# Patient Record
Sex: MALE | Race: WHITE | NOT HISPANIC OR LATINO | Employment: OTHER | URBAN - METROPOLITAN AREA
[De-identification: names, ages, dates, MRNs, and addresses within clinical notes are randomized per-mention and may not be internally consistent; named-entity substitution may affect disease eponyms.]

---

## 2024-03-12 ENCOUNTER — PREP FOR SURGERY (OUTPATIENT)
Dept: OTHER | Facility: HOSPITAL | Age: 44
End: 2024-03-12
Payer: OTHER GOVERNMENT

## 2024-03-12 ENCOUNTER — CLINICAL SUPPORT (OUTPATIENT)
Dept: GASTROENTEROLOGY | Facility: CLINIC | Age: 44
End: 2024-03-12
Payer: OTHER GOVERNMENT

## 2024-03-12 DIAGNOSIS — Z12.11 ENCOUNTER FOR SCREENING FOR MALIGNANT NEOPLASM OF COLON: Primary | ICD-10-CM

## 2024-03-12 RX ORDER — POLYETHYLENE GLYCOL 3350, SODIUM SULFATE ANHYDROUS, SODIUM BICARBONATE, SODIUM CHLORIDE, POTASSIUM CHLORIDE 236; 22.74; 6.74; 5.86; 2.97 G/4L; G/4L; G/4L; G/4L; G/4L
4 POWDER, FOR SOLUTION ORAL ONCE
Qty: 4000 ML | Refills: 0 | Status: SHIPPED | OUTPATIENT
Start: 2024-03-12 | End: 2024-03-12

## 2024-03-12 NOTE — PROGRESS NOTES
Charles Hawkins  1980  43 y.o.    Reason for call: Screening Colonoscopy and Positive Cologuard  Prep prescribed: [place]  Prep instructions reviewed with patient and sent to patient via regular mail to the home address on file  Is the patient currently on any injectable medications for weight loss or diabetes? No  Clearance needed? No  If yes, what clearance is needed? N/A  Clearance has been requested from NA  The patient has been scheduled for: Colonoscopy  After your procedure, you will be contacted with results. Please confirm the best phone # to reach the patient: [phone]  Family history of colon cancer? No  If yes, indicate relative: NA  Family History   Problem Relation Age of Onset    Colon cancer Neg Hx      History reviewed. No pertinent past medical history.  No Known Allergies  Past Surgical History:   Procedure Laterality Date    KNEE FUSION       Social History     Socioeconomic History    Marital status: Unknown   Tobacco Use    Smoking status: Never     Passive exposure: Never    Smokeless tobacco: Never   Substance and Sexual Activity    Alcohol use: Yes    Drug use: Defer    Sexual activity: Defer     No current outpatient medications on file.

## 2024-03-19 NOTE — PRE-PROCEDURE INSTRUCTIONS
"PAT call attempted.  No answer.  Detailed message with date and arrival time of 0600 given.  Come to entrance \"C\"; must have adult  for transportation home; may have two visitors; however, children under 12 must remain in waiting area; instructed on diet/clear liquids/NPO/bowel prep, if needed; may take normal meds two hours prior to arrival time except for blood thinners, antidiabetics, diuretics, and weight loss meds.  Instructed to return call to confirm receipt of instructions and for any questions.  "

## 2024-03-22 ENCOUNTER — ANESTHESIA EVENT (OUTPATIENT)
Dept: GASTROENTEROLOGY | Facility: HOSPITAL | Age: 44
End: 2024-03-22
Payer: OTHER GOVERNMENT

## 2024-03-22 NOTE — ANESTHESIA PREPROCEDURE EVALUATION
Anesthesia Evaluation     Patient summary reviewed and Nursing notes reviewed   no history of anesthetic complications:   NPO Solid Status: > 8 hours  NPO Liquid Status: > 2 hours           Airway   Mallampati: II  TM distance: >3 FB  Neck ROM: full  Dental - normal exam     Pulmonary - negative pulmonary ROS    breath sounds clear to auscultation  Cardiovascular - negative cardio ROS  Exercise tolerance: good (4-7 METS)    Rhythm: regular  Rate: normal        Neuro/Psych    ROS Comment: STATES HAS HAD TWO HERNIATED CERVICAL DISCS IN PAST--DENIES PAIN, NO SURGICAL INTERVENTION  GI/Hepatic/Renal/Endo - negative ROS     Musculoskeletal (-) negative ROS    Abdominal    Substance History - negative use     OB/GYN negative ob/gyn ROS         Other - negative ROS       ROS/Med Hx Other: PAT Nursing Notes unavailable.               Anesthesia Plan    ASA 1     general   total IV anesthesia  (Total IV Anesthesia    Patient understands anesthesia not responsible for dental damage.  )  intravenous induction     Anesthetic plan, risks, benefits, and alternatives have been provided, discussed and informed consent has been obtained with: patient.    Plan discussed with CRNA.    CODE STATUS:

## 2024-03-25 ENCOUNTER — ANESTHESIA (OUTPATIENT)
Dept: GASTROENTEROLOGY | Facility: HOSPITAL | Age: 44
End: 2024-03-25
Payer: OTHER GOVERNMENT

## 2024-03-25 ENCOUNTER — HOSPITAL ENCOUNTER (OUTPATIENT)
Facility: HOSPITAL | Age: 44
Setting detail: HOSPITAL OUTPATIENT SURGERY
Discharge: HOME | End: 2024-03-25
Attending: INTERNAL MEDICINE | Admitting: INTERNAL MEDICINE
Payer: OTHER GOVERNMENT

## 2024-03-25 VITALS
BODY MASS INDEX: 29.57 KG/M2 | RESPIRATION RATE: 18 BRPM | OXYGEN SATURATION: 99 % | HEART RATE: 58 BPM | WEIGHT: 211.2 LBS | TEMPERATURE: 97.2 F | SYSTOLIC BLOOD PRESSURE: 148 MMHG | HEIGHT: 71 IN | DIASTOLIC BLOOD PRESSURE: 93 MMHG

## 2024-03-25 PROCEDURE — 25010000002 PROPOFOL 500 MG/50ML EMULSION

## 2024-03-25 PROCEDURE — 25010000002 PROPOFOL 10 MG/ML EMULSION

## 2024-03-25 PROCEDURE — 25810000003 LACTATED RINGERS PER 1000 ML: Performed by: NURSE ANESTHETIST, CERTIFIED REGISTERED

## 2024-03-25 RX ORDER — PROPOFOL 10 MG/ML
INJECTION, EMULSION INTRAVENOUS CONTINUOUS PRN
Status: DISCONTINUED | OUTPATIENT
Start: 2024-03-25 | End: 2024-03-25 | Stop reason: SURG

## 2024-03-25 RX ORDER — SODIUM CHLORIDE, SODIUM LACTATE, POTASSIUM CHLORIDE, CALCIUM CHLORIDE 600; 310; 30; 20 MG/100ML; MG/100ML; MG/100ML; MG/100ML
30 INJECTION, SOLUTION INTRAVENOUS CONTINUOUS
Status: DISCONTINUED | OUTPATIENT
Start: 2024-03-25 | End: 2024-03-25 | Stop reason: HOSPADM

## 2024-03-25 RX ORDER — PROPOFOL 10 MG/ML
VIAL (ML) INTRAVENOUS AS NEEDED
Status: DISCONTINUED | OUTPATIENT
Start: 2024-03-25 | End: 2024-03-25 | Stop reason: SURG

## 2024-03-25 RX ORDER — LIDOCAINE HYDROCHLORIDE 20 MG/ML
INJECTION, SOLUTION EPIDURAL; INFILTRATION; INTRACAUDAL; PERINEURAL AS NEEDED
Status: DISCONTINUED | OUTPATIENT
Start: 2024-03-25 | End: 2024-03-25 | Stop reason: SURG

## 2024-03-25 RX ADMIN — SODIUM CHLORIDE, POTASSIUM CHLORIDE, SODIUM LACTATE AND CALCIUM CHLORIDE 30 ML/HR: 600; 310; 30; 20 INJECTION, SOLUTION INTRAVENOUS at 06:51

## 2024-03-25 RX ADMIN — PROPOFOL 140 MG: 10 INJECTION, EMULSION INTRAVENOUS at 07:49

## 2024-03-25 RX ADMIN — PROPOFOL 250 MCG/KG/MIN: 10 INJECTION, EMULSION INTRAVENOUS at 07:49

## 2024-03-25 RX ADMIN — LIDOCAINE HYDROCHLORIDE 40 MG: 20 INJECTION, SOLUTION EPIDURAL; INFILTRATION; INTRACAUDAL; PERINEURAL at 07:49

## 2024-03-25 NOTE — H&P
"Pre Procedure History & Physical    Chief Complaint: Colonoscopy    HPI: Patient is 43 years old male with no significant past medical history presented for average risk screening colonoscopy. Patient never had colonoscopy.  He denies family history of colon cancer or colon polyps.  Patient does not take any antiplatelets, anticoagulant or NSAIDs medications    Patient denies obvious GI symptoms including melena, hematochezia or bright red blood per rectum.  No history of hemorrhoids.  No nausea, vomiting, abdominal pain, poor appetite or unintentional significant weight loss      Past Medical History:   History reviewed. No pertinent past medical history.    Past Surgical History:  Past Surgical History:   Procedure Laterality Date    KNEE FUSION         Family History:  Family History   Problem Relation Age of Onset    Colon cancer Neg Hx        Social History:   reports that he has never smoked. He has never been exposed to tobacco smoke. He has never used smokeless tobacco. He reports current alcohol use. Drug use questions deferred to the physician.    Medications:   No medications prior to admission.       Allergies:  Patient has no known allergies.      Objective     Blood pressure 134/94, pulse 50, temperature 98.3 °F (36.8 °C), temperature source Temporal, resp. rate 16, height 180.3 cm (71\"), weight 95.8 kg (211 lb 3.2 oz), SpO2 96%.    Physical Exam   Constitutional: Pt is oriented to person, place, and time and well-developed, well-nourished, and in no distress.   Mouth/Throat: Oropharynx is clear and moist.   Neck: Normal range of motion.   Cardiovascular: Normal rate, regular rhythm and normal heart sounds.    Pulmonary/Chest: Effort normal and breath sounds normal.   Abdominal: Soft. Nontender  Skin: Skin is warm and dry.   Psychiatric: Mood, memory, affect and judgment normal.     Assessment & Plan     Diagnosis:    Average risk screening colonoscopy    Anticipated Surgical " Procedure    Colonoscopy    The risks, benefits, and alternatives of this procedure have been discussed with the patient or the responsible party- the patient understands and agrees to proceed.        Electronically signed by Anuj Saleem MD, 03/25/24, 7:36 AM EDT.

## 2024-03-25 NOTE — ANESTHESIA POSTPROCEDURE EVALUATION
Patient: Charles Hawkins    Procedure Summary       Date: 03/25/24 Room / Location: [facility] ENDOSCOPY 5 / [facility] ENDOSCOPY    Anesthesia Start: 0746 Anesthesia Stop: 0815    Procedure: COLONOSCOPY Diagnosis:       Encounter for screening for malignant neoplasm of colon      (Encounter for screening for malignant neoplasm of colon [Z12.11])    Surgeons: Anuj Saleem MD Provider: Zaki Jorge CRNA    Anesthesia Type: general ASA Status: 1            Anesthesia Type: general    Vitals  Vitals Value Taken Time   /93 03/25/24 0829   Temp 36.2 °C (97.2 °F) 03/25/24 0828   Pulse 51 03/25/24 0830   Resp 18 03/25/24 0828   SpO2 99 % 03/25/24 0830   Vitals shown include unfiled device data.        Post Anesthesia Care and Evaluation    Post-procedure mental status: acceptable.  Pain management: satisfactory to patient    Airway patency: patent  Anesthetic complications: No anesthetic complications    Cardiovascular status: acceptable  Respiratory status: acceptable    Comments: Per chart review

## 2024-03-28 ENCOUNTER — OFFICE VISIT (OUTPATIENT)
Dept: SURGERY | Facility: CLINIC | Age: 44
End: 2024-03-28
Payer: OTHER GOVERNMENT

## 2024-03-28 VITALS — HEIGHT: 71 IN | RESPIRATION RATE: 16 BRPM | BODY MASS INDEX: 29.4 KG/M2 | WEIGHT: 210 LBS

## 2024-03-28 DIAGNOSIS — R22.42 LOCALIZED SWELLING, MASS, OR LUMP OF LEFT LOWER EXTREMITY: Primary | ICD-10-CM

## 2024-03-28 DIAGNOSIS — R22.2 MASS OF CHEST WALL, RIGHT: ICD-10-CM

## 2024-03-28 PROBLEM — R22.40 LOCALIZED SWELLING, MASS AND LUMP, LOWER LIMB: Status: ACTIVE | Noted: 2024-03-28

## 2024-03-28 RX ORDER — POLYETHYLENE GLYCOL-3350 AND ELECTROLYTES 236; 6.74; 5.86; 2.97; 22.74 G/274.31G; G/274.31G; G/274.31G; G/274.31G; G/274.31G
POWDER, FOR SOLUTION ORAL
COMMUNITY
Start: 2024-03-16

## 2024-03-28 NOTE — H&P (VIEW-ONLY)
"Chief Complaint: Follow-up    Subjective         History of Present Illness  Charles Hawkins is a 43 y.o. male presents to [facility] GENERAL SURGERY to be seen for 3 cm right chest wall mass.  He also has a left lower extremity 1 cm lesion that appears to be a cyst.     Objective     Past Medical History:   Diagnosis Date    Diverticulitis of colon 25 MAR 24    Minor - id during colonoscopy       Past Surgical History:   Procedure Laterality Date    COLONOSCOPY N/A 03/25/2024    Procedure: COLONOSCOPY;  Surgeon: Anuj Saleem MD;  Location: [facility] ENDOSCOPY;  Service: Gastroenterology;  Laterality: N/A;  DIVERTICULOSIS, EXTERNAL HEMORRHOIDS    FRACTURE SURGERY  1997    L Ankle    KNEE FUSION           Current Outpatient Medications:     GaviLyte-G 236 g solution, TAKE 4,000 ML BY MOUTH ONE TIME FOR 1 DOSE, Disp: , Rfl:     No Known Allergies     Family History   Problem Relation Age of Onset    Colon cancer Neg Hx        Social History     Socioeconomic History    Marital status: Unknown   Tobacco Use    Smoking status: Never     Passive exposure: Never    Smokeless tobacco: Never   Substance and Sexual Activity    Alcohol use: Yes     Alcohol/week: 2.0 standard drinks of alcohol     Types: 2 Glasses of wine per week    Drug use: Never    Sexual activity: Yes     Partners: Female     Birth control/protection: Tubal ligation       Vital Signs:   Resp 16   Ht 180.3 cm (71\")   Wt 95.3 kg (210 lb)   BMI 29.29 kg/m²    Review of Systems    Physical Exam  Vitals and nursing note reviewed.   Constitutional:       General: He is not in acute distress.     Appearance: Normal appearance. He is well-developed.   HENT:      Head: Normocephalic and atraumatic.   Eyes:      Extraocular Movements: Extraocular movements intact.      Pupils: Pupils are equal, round, and reactive to light.   Cardiovascular:      Pulses: Normal pulses.   Pulmonary:      Effort: Pulmonary effort is normal. No retractions.      " Breath sounds: Normal air entry. No wheezing.   Abdominal:      General: There is no distension.      Palpations: Abdomen is soft.      Tenderness: There is no abdominal tenderness.      Hernia: No hernia is present.   Musculoskeletal:         General: No swelling or deformity.      Cervical back: Neck supple.   Skin:     General: Skin is warm and dry.      Findings: No erythema.      Comments: 3 cm chest wall mass that is mobile and feels to be a possible lipoma and a 1 cm left lower extremity lesion that is likely a cyst   Neurological:      General: No focal deficit present.      Mental Status: He is alert and oriented to person, place, and time.      Motor: Motor function is intact.   Psychiatric:         Mood and Affect: Mood normal.         Thought Content: Thought content normal.          Result Review :               Assessment and Plan    Diagnoses and all orders for this visit:    1. Localized swelling, mass, or lump of left lower extremity (Primary)  -     Case Request; Standing  -     Follow Anesthesia Guidelines / Protocol; Standing  -     Obtain Informed Consent; Standing  -     Place Sequential Compression Device in Pre-Op; Standing  -     LMX Cream Not Needed For Needle Localization Procedure in Radiology; Standing  -     Contact Surgeon With Questions or Concerns; Standing  -     Verify / Perform Chlorhexidine Skin Prep; Standing  -     ceFAZolin (ANCEF) 2,000 mg in sodium chloride 0.9 % 100 mL IVPB  -     Case Request    2. Mass of chest wall, right    Will plan for excision of chest wall mass and lower extremity mass    Follow Up   Return for Next scheduled followup after surgery.  Patient was given instructions and counseling regarding his condition or for health maintenance advice. Please see specific information pulled into the AVS if appropriate.         This document has been electronically signed by Kaye Desai MD  March 28, 2024 10:23 EDT

## 2024-03-28 NOTE — PROGRESS NOTES
"Chief Complaint: Follow-up    Subjective         History of Present Illness  Charles Hawkins is a 43 y.o. male presents to [facility] GENERAL SURGERY to be seen for 3 cm right chest wall mass.  He also has a left lower extremity 1 cm lesion that appears to be a cyst.     Objective     Past Medical History:   Diagnosis Date    Diverticulitis of colon 25 MAR 24    Minor - id during colonoscopy       Past Surgical History:   Procedure Laterality Date    COLONOSCOPY N/A 03/25/2024    Procedure: COLONOSCOPY;  Surgeon: Anuj Saleem MD;  Location: [facility] ENDOSCOPY;  Service: Gastroenterology;  Laterality: N/A;  DIVERTICULOSIS, EXTERNAL HEMORRHOIDS    FRACTURE SURGERY  1997    L Ankle    KNEE FUSION           Current Outpatient Medications:     GaviLyte-G 236 g solution, TAKE 4,000 ML BY MOUTH ONE TIME FOR 1 DOSE, Disp: , Rfl:     No Known Allergies     Family History   Problem Relation Age of Onset    Colon cancer Neg Hx        Social History     Socioeconomic History    Marital status: Unknown   Tobacco Use    Smoking status: Never     Passive exposure: Never    Smokeless tobacco: Never   Substance and Sexual Activity    Alcohol use: Yes     Alcohol/week: 2.0 standard drinks of alcohol     Types: 2 Glasses of wine per week    Drug use: Never    Sexual activity: Yes     Partners: Female     Birth control/protection: Tubal ligation       Vital Signs:   Resp 16   Ht 180.3 cm (71\")   Wt 95.3 kg (210 lb)   BMI 29.29 kg/m²    Review of Systems    Physical Exam  Vitals and nursing note reviewed.   Constitutional:       General: He is not in acute distress.     Appearance: Normal appearance. He is well-developed.   HENT:      Head: Normocephalic and atraumatic.   Eyes:      Extraocular Movements: Extraocular movements intact.      Pupils: Pupils are equal, round, and reactive to light.   Cardiovascular:      Pulses: Normal pulses.   Pulmonary:      Effort: Pulmonary effort is normal. No retractions.      " Breath sounds: Normal air entry. No wheezing.   Abdominal:      General: There is no distension.      Palpations: Abdomen is soft.      Tenderness: There is no abdominal tenderness.      Hernia: No hernia is present.   Musculoskeletal:         General: No swelling or deformity.      Cervical back: Neck supple.   Skin:     General: Skin is warm and dry.      Findings: No erythema.      Comments: 3 cm chest wall mass that is mobile and feels to be a possible lipoma and a 1 cm left lower extremity lesion that is likely a cyst   Neurological:      General: No focal deficit present.      Mental Status: He is alert and oriented to person, place, and time.      Motor: Motor function is intact.   Psychiatric:         Mood and Affect: Mood normal.         Thought Content: Thought content normal.          Result Review :               Assessment and Plan    Diagnoses and all orders for this visit:    1. Localized swelling, mass, or lump of left lower extremity (Primary)  -     Case Request; Standing  -     Follow Anesthesia Guidelines / Protocol; Standing  -     Obtain Informed Consent; Standing  -     Place Sequential Compression Device in Pre-Op; Standing  -     LMX Cream Not Needed For Needle Localization Procedure in Radiology; Standing  -     Contact Surgeon With Questions or Concerns; Standing  -     Verify / Perform Chlorhexidine Skin Prep; Standing  -     ceFAZolin (ANCEF) 2,000 mg in sodium chloride 0.9 % 100 mL IVPB  -     Case Request    2. Mass of chest wall, right    Will plan for excision of chest wall mass and lower extremity mass    Follow Up   Return for Next scheduled followup after surgery.  Patient was given instructions and counseling regarding his condition or for health maintenance advice. Please see specific information pulled into the AVS if appropriate.         This document has been electronically signed by Kaye Desai MD  March 28, 2024 10:23 EDT

## 2024-04-11 NOTE — PRE-PROCEDURE INSTRUCTIONS
IMPORTANT INSTRUCTIONS - PRE-ADMISSION TESTING  DO NOT EAT OR CHEW anything after midnight the night before your procedure.    You may have CLEAR liquids up to 2_ hours prior to ARRIVAL time.   Take the following medications the morning of your procedure with JUST A SIP OF WATER: NONE    DO NOT BRING your medications to the hospital with you, UNLESS something has changed since your PRE-Admission Testing appointment.  Hold all vitamins, supplements, and NSAIDS (Non- steroidal anti-inflammatory meds) for one week prior to surgery (you MAY take Tylenol or Acetaminophen).  If you are diabetic, check your blood sugar the morning of your procedure. If it is less than 70 or if you are feeling symptomatic, call the following number for further instructions: [phone] [facility].  Use your inhalers/nebulizers as usual, the morning of your procedure. BRING YOUR INHALERS with you.   Bring your CPAP or BIPAP to hospital, ONLY IF YOU WILL BE SPENDING THE NIGHT.   Make sure you have a ride home and have someone who will stay with you the day of your procedure after you go home.  If you have any questions, please call your Pre-Admission Testing NurseMELVA at [phone].   Per anesthesia request, do not smoke for 24 hours before your procedure or as instructed by your surgeon.    Clear Liquid Diet        Find out when you need to start a clear liquid diet.   Think of “clear liquids” as anything you could read a newspaper through. This includes things like water, broth, sports drinks, or tea WITHOUT any kind of milk or cream.           Once you are told to start a clear liquid diet, only drink these things until 2 hours before arrival to the hospital or when the hospital says to stop. Total volume limitation: 8 oz.       Clear liquids you CAN drink:   Water   Clear broth: beef, chicken, vegetable, or bone broth with nothing in it   Gatorade   Lemonade or Luke-aid   Soda   Tea, coffee (NO cream or honey)    Jell-O (without fruit)   Popsicles (without fruit or cream)   Italian ices   Juice without pulp: apple, white, grape   You may use salt, pepper, and sugar    Do NOT drink:   Milk or cream   Soy milk, almond milk, coconut milk, or other non-dairy drinks and   creamers   Milkshakes or smoothies   Tomato juice   Orange juice   Grapefruit juice   Cream soups or any other than broth         Clear Liquid Diet:  Do NOT eat any solid food.  Do NOT eat or suck on mints or candy.  Do NOT chew gum.  Do NOT drink thick liquids like milk or juice with pulp in it.  Do NOT add milk, cream, or anything like soy milk or almond milk to coffee or tea.   PREOPERATIVE (BEFORE SURGERY)              BATHING INSTRUCTIONS  Instructions:    You will need to shower 1 time   Wash your hair and face with normal shampoo and soap, rinse it well before using the surgical soap.      In the shower, wet the skin completely with water from your neck to your feet. Apply the cleanser to your   body ONLY FROM THE NECK TO YOUR FEET.     Do NOT USE THE CLEANSER ON YOUR FACE, HEAD, OR GENITAL (PRIVATE) AREAS.   Keep it out of your eyes, ears, and mouth because of the risk of injury to those areas.      Scrub with a clean washcloth for each bath utilizing the soap provided from the top of your body to the   bottom starting at the neck area.      Pay close attention to your armpits, groin area, and the site of surgery.      Wash your body gently for 5 minutes. Stand outside the stream or turn off the water while scrubbing your   body. Do NOT wash with your regular soap after the surgical cleanser is used.      RINSE THE CLEANSER OFF COMPLETELY with plenty of water. Rinse the area again thoroughly.      Dry off with a clean towel. The surgical soap can cause dryness; however do NOT APPLY LOTION,   CREAM, POWDER, and/or DEODORANT AFTER SHOWERING.     Be sure to where clean clothes after showering.      Ensure CLEAN BED LINENS AFTER FIRST wash with the  surgical soap.      NO PETS ALLOWED IN THE BED with you after utilizing the surgical soap.

## 2024-04-16 ENCOUNTER — ANESTHESIA EVENT (OUTPATIENT)
Dept: PERIOP | Facility: HOSPITAL | Age: 44
End: 2024-04-16
Payer: OTHER GOVERNMENT

## 2024-04-16 ENCOUNTER — ANESTHESIA (OUTPATIENT)
Dept: PERIOP | Facility: HOSPITAL | Age: 44
End: 2024-04-16
Payer: OTHER GOVERNMENT

## 2024-04-16 ENCOUNTER — HOSPITAL ENCOUNTER (OUTPATIENT)
Facility: HOSPITAL | Age: 44
Setting detail: HOSPITAL OUTPATIENT SURGERY
Discharge: HOME | End: 2024-04-16
Attending: SURGERY | Admitting: SURGERY
Payer: OTHER GOVERNMENT

## 2024-04-16 VITALS
DIASTOLIC BLOOD PRESSURE: 78 MMHG | TEMPERATURE: 97.4 F | HEIGHT: 71 IN | HEART RATE: 57 BPM | SYSTOLIC BLOOD PRESSURE: 118 MMHG | OXYGEN SATURATION: 94 % | RESPIRATION RATE: 22 BRPM | BODY MASS INDEX: 29.07 KG/M2 | WEIGHT: 207.67 LBS

## 2024-04-16 DIAGNOSIS — R22.42 LOCALIZED SWELLING, MASS, OR LUMP OF LEFT LOWER EXTREMITY: ICD-10-CM

## 2024-04-16 DIAGNOSIS — R22.2 MASS OF CHEST WALL, RIGHT: Primary | ICD-10-CM

## 2024-04-16 PROCEDURE — 25010000002 GLYCOPYRROLATE 0.2 MG/ML SOLUTION

## 2024-04-16 PROCEDURE — 25010000002 CEFAZOLIN PER 500 MG: Performed by: SURGERY

## 2024-04-16 PROCEDURE — 88341 IMHCHEM/IMCYTCHM EA ADD ANTB: CPT | Performed by: SURGERY

## 2024-04-16 PROCEDURE — 25010000002 PROPOFOL 10 MG/ML EMULSION

## 2024-04-16 PROCEDURE — 25010000002 ONDANSETRON PER 1 MG

## 2024-04-16 PROCEDURE — 25010000002 BUPIVACAINE (PF) 0.25 % SOLUTION: Performed by: SURGERY

## 2024-04-16 PROCEDURE — 88304 TISSUE EXAM BY PATHOLOGIST: CPT | Performed by: SURGERY

## 2024-04-16 PROCEDURE — 21552 EXC NECK LES SC 3 CM/>: CPT | Performed by: SURGERY

## 2024-04-16 PROCEDURE — 25010000002 DEXAMETHASONE PER 1 MG

## 2024-04-16 PROCEDURE — 25010000002 FENTANYL CITRATE (PF) 50 MCG/ML SOLUTION

## 2024-04-16 PROCEDURE — 88307 TISSUE EXAM BY PATHOLOGIST: CPT | Performed by: SURGERY

## 2024-04-16 PROCEDURE — 25010000002 MIDAZOLAM PER 1MG: Performed by: ANESTHESIOLOGY

## 2024-04-16 PROCEDURE — 25810000003 LACTATED RINGERS PER 1000 ML: Performed by: ANESTHESIOLOGY

## 2024-04-16 PROCEDURE — 11401 EXC TR-EXT B9+MARG 0.6-1 CM: CPT | Performed by: SURGERY

## 2024-04-16 PROCEDURE — 88342 IMHCHEM/IMCYTCHM 1ST ANTB: CPT | Performed by: SURGERY

## 2024-04-16 RX ORDER — SODIUM CHLORIDE, SODIUM LACTATE, POTASSIUM CHLORIDE, CALCIUM CHLORIDE 600; 310; 30; 20 MG/100ML; MG/100ML; MG/100ML; MG/100ML
9 INJECTION, SOLUTION INTRAVENOUS CONTINUOUS PRN
Status: DISCONTINUED | OUTPATIENT
Start: 2024-04-16 | End: 2024-04-16 | Stop reason: HOSPADM

## 2024-04-16 RX ORDER — OXYCODONE HYDROCHLORIDE 5 MG/1
5 TABLET ORAL
Status: DISCONTINUED | OUTPATIENT
Start: 2024-04-16 | End: 2024-04-16 | Stop reason: HOSPADM

## 2024-04-16 RX ORDER — DEXAMETHASONE SODIUM PHOSPHATE 4 MG/ML
INJECTION, SOLUTION INTRA-ARTICULAR; INTRALESIONAL; INTRAMUSCULAR; INTRAVENOUS; SOFT TISSUE AS NEEDED
Status: DISCONTINUED | OUTPATIENT
Start: 2024-04-16 | End: 2024-04-16 | Stop reason: SURG

## 2024-04-16 RX ORDER — DEXMEDETOMIDINE HYDROCHLORIDE 100 UG/ML
INJECTION, SOLUTION INTRAVENOUS AS NEEDED
Status: DISCONTINUED | OUTPATIENT
Start: 2024-04-16 | End: 2024-04-16 | Stop reason: SURG

## 2024-04-16 RX ORDER — BUPIVACAINE HCL/0.9 % NACL/PF 0.1 %
2000 PLASTIC BAG, INJECTION (ML) EPIDURAL ONCE
Status: COMPLETED | OUTPATIENT
Start: 2024-04-16 | End: 2024-04-16

## 2024-04-16 RX ORDER — ACETAMINOPHEN 500 MG
1000 TABLET ORAL ONCE
Status: COMPLETED | OUTPATIENT
Start: 2024-04-16 | End: 2024-04-16

## 2024-04-16 RX ORDER — MIDAZOLAM HYDROCHLORIDE 2 MG/2ML
2 INJECTION, SOLUTION INTRAMUSCULAR; INTRAVENOUS ONCE
Status: COMPLETED | OUTPATIENT
Start: 2024-04-16 | End: 2024-04-16

## 2024-04-16 RX ORDER — PROMETHAZINE HYDROCHLORIDE 12.5 MG/1
25 TABLET ORAL ONCE AS NEEDED
Status: DISCONTINUED | OUTPATIENT
Start: 2024-04-16 | End: 2024-04-16 | Stop reason: HOSPADM

## 2024-04-16 RX ORDER — PROMETHAZINE HYDROCHLORIDE 25 MG/1
25 SUPPOSITORY RECTAL ONCE AS NEEDED
Status: DISCONTINUED | OUTPATIENT
Start: 2024-04-16 | End: 2024-04-16 | Stop reason: HOSPADM

## 2024-04-16 RX ORDER — HYDROCODONE BITARTRATE AND ACETAMINOPHEN 5; 325 MG/1; MG/1
1-2 TABLET ORAL EVERY 4 HOURS PRN
Qty: 20 TABLET | Refills: 0 | Status: SHIPPED | OUTPATIENT
Start: 2024-04-16

## 2024-04-16 RX ORDER — BUPIVACAINE HYDROCHLORIDE 2.5 MG/ML
INJECTION, SOLUTION EPIDURAL; INFILTRATION; INTRACAUDAL AS NEEDED
Status: DISCONTINUED | OUTPATIENT
Start: 2024-04-16 | End: 2024-04-16 | Stop reason: HOSPADM

## 2024-04-16 RX ORDER — ONDANSETRON 4 MG/1
4 TABLET, ORALLY DISINTEGRATING ORAL ONCE AS NEEDED
Status: DISCONTINUED | OUTPATIENT
Start: 2024-04-16 | End: 2024-04-16 | Stop reason: HOSPADM

## 2024-04-16 RX ORDER — ONDANSETRON 2 MG/ML
INJECTION INTRAMUSCULAR; INTRAVENOUS AS NEEDED
Status: DISCONTINUED | OUTPATIENT
Start: 2024-04-16 | End: 2024-04-16 | Stop reason: SURG

## 2024-04-16 RX ORDER — MEPERIDINE HYDROCHLORIDE 25 MG/ML
12.5 INJECTION INTRAMUSCULAR; INTRAVENOUS; SUBCUTANEOUS
Status: DISCONTINUED | OUTPATIENT
Start: 2024-04-16 | End: 2024-04-16 | Stop reason: HOSPADM

## 2024-04-16 RX ORDER — LIDOCAINE HYDROCHLORIDE 20 MG/ML
INJECTION, SOLUTION EPIDURAL; INFILTRATION; INTRACAUDAL; PERINEURAL AS NEEDED
Status: DISCONTINUED | OUTPATIENT
Start: 2024-04-16 | End: 2024-04-16 | Stop reason: SURG

## 2024-04-16 RX ORDER — GLYCOPYRROLATE 0.2 MG/ML
INJECTION INTRAMUSCULAR; INTRAVENOUS AS NEEDED
Status: DISCONTINUED | OUTPATIENT
Start: 2024-04-16 | End: 2024-04-16 | Stop reason: SURG

## 2024-04-16 RX ORDER — ONDANSETRON 2 MG/ML
4 INJECTION INTRAMUSCULAR; INTRAVENOUS ONCE AS NEEDED
Status: DISCONTINUED | OUTPATIENT
Start: 2024-04-16 | End: 2024-04-16 | Stop reason: HOSPADM

## 2024-04-16 RX ORDER — FENTANYL CITRATE 50 UG/ML
INJECTION, SOLUTION INTRAMUSCULAR; INTRAVENOUS AS NEEDED
Status: DISCONTINUED | OUTPATIENT
Start: 2024-04-16 | End: 2024-04-16 | Stop reason: SURG

## 2024-04-16 RX ORDER — EPHEDRINE SULFATE 50 MG/ML
INJECTION INTRAVENOUS AS NEEDED
Status: DISCONTINUED | OUTPATIENT
Start: 2024-04-16 | End: 2024-04-16 | Stop reason: SURG

## 2024-04-16 RX ORDER — PROPOFOL 10 MG/ML
VIAL (ML) INTRAVENOUS AS NEEDED
Status: DISCONTINUED | OUTPATIENT
Start: 2024-04-16 | End: 2024-04-16 | Stop reason: SURG

## 2024-04-16 RX ADMIN — FENTANYL CITRATE 25 MCG: 50 INJECTION, SOLUTION INTRAMUSCULAR; INTRAVENOUS at 08:28

## 2024-04-16 RX ADMIN — SODIUM CHLORIDE, POTASSIUM CHLORIDE, SODIUM LACTATE AND CALCIUM CHLORIDE 9 ML/HR: 600; 310; 30; 20 INJECTION, SOLUTION INTRAVENOUS at 06:32

## 2024-04-16 RX ADMIN — MIDAZOLAM HYDROCHLORIDE 2 MG: 1 INJECTION, SOLUTION INTRAMUSCULAR; INTRAVENOUS at 07:58

## 2024-04-16 RX ADMIN — GLYCOPYRROLATE 0.2 MG: 0.2 INJECTION INTRAMUSCULAR; INTRAVENOUS at 08:08

## 2024-04-16 RX ADMIN — DEXAMETHASONE SODIUM PHOSPHATE 8 MG: 4 INJECTION, SOLUTION INTRAMUSCULAR; INTRAVENOUS at 08:14

## 2024-04-16 RX ADMIN — DEXMEDETOMIDINE 10 MCG: 100 INJECTION, SOLUTION INTRAVENOUS at 08:16

## 2024-04-16 RX ADMIN — EPHEDRINE SULFATE 5 MG: 50 INJECTION INTRAVENOUS at 08:24

## 2024-04-16 RX ADMIN — ACETAMINOPHEN 1000 MG: 500 TABLET ORAL at 06:32

## 2024-04-16 RX ADMIN — FENTANYL CITRATE 25 MCG: 50 INJECTION, SOLUTION INTRAMUSCULAR; INTRAVENOUS at 08:10

## 2024-04-16 RX ADMIN — Medication 2000 MG: at 08:14

## 2024-04-16 RX ADMIN — PROPOFOL 250 MG: 10 INJECTION, EMULSION INTRAVENOUS at 08:10

## 2024-04-16 RX ADMIN — LIDOCAINE HYDROCHLORIDE 100 MG: 20 INJECTION, SOLUTION INTRAVENOUS at 08:10

## 2024-04-16 RX ADMIN — ONDANSETRON HYDROCHLORIDE 4 MG: 2 SOLUTION INTRAMUSCULAR; INTRAVENOUS at 08:14

## 2024-04-16 NOTE — OP NOTE
EXCISION LESION  Procedure Report    Patient Name:  Charles Hawkins  YOB: 1980    Date of Surgery:  4/16/2024     Indications: soft tissue masses    Pre-op Diagnosis:   Localized swelling, mass, or lump of left lower extremity [R22.42]       Post-Op Diagnosis Codes:     * Localized swelling, mass, or lump of left lower extremity [R22.42]    Procedure/CPT® Codes:      Procedure(s):  MASS SOFT TISSUE EXCISION, LEFT LEG and right chest    Staff:  Surgeon(s):  Kaye Desai MD         Anesthesia: General    Estimated Blood Loss: minimal    Implants:    Nothing was implanted during the procedure    Specimen:          Specimens       ID Source Type Tests Collected By Collected At Frozen?    A Chest, Right Tissue TISSUE PATHOLOGY EXAM   Kaye Desai MD 4/16/24 0831     Description: Right chest lipoma    B Thigh, Left Tissue TISSUE PATHOLOGY EXAM   Kaye Desai MD 4/16/24 0836     Description: left inner thigh lesion short stitch superior long stitch lateral                Findings: None    Complications: None    Description of Procedure:   The risks and benefits and treatment options were discussed with him.   After informed consent was obtained, he was taken to the OR and placed supine on the table. An incision was made directly over the palpable mass and then skin flaps were raised anteriorly and the tissue was excised circumferentially around the mass. The specimen was removed and marked for orientation. It was about 3.5 cm in size.  The wound bed was irrigated. Hemostasis was achieved with electrocautery and clips. The deeper layers were closed with 2-0 Vicryl, the skin was closed with a 4-0 subcuticular stitch.     We began by making elliptical incision around the cyst and dissecting down use electrocautery to reach normal-appearing tissue.  The cyst was then excised circumferentially and handed off as a specimen.  Electrocautery was used to achieve hemostasis.  The wound was  copiously irrigated and and the deeper layers were closed with 2-0 Vicryl and skin was closed with a running 4 subcuticular stitch.     The patient tolerated the procedure well and will follow up in 2 weeks.        This procedure was not performed to treat primary cutaneous melanoma through wide local excision      Kaye Desai MD     Date: 4/16/2024  Time: 09:29 EDT

## 2024-04-16 NOTE — INTERVAL H&P NOTE
H&P reviewed.  The patient was examined and there are no changes to the H&P.  Risks and benefits discussed with patient and allergies reviewed.

## 2024-04-16 NOTE — ANESTHESIA PROCEDURE NOTES
Airway  Urgency: elective    Date/Time: 4/16/2024 8:12 AM  Airway not difficult    General Information and Staff    Patient location during procedure: OR  CRNA/CAA: Benita Alegria CRNA    Indications and Patient Condition  Indications for airway management: airway protection    Preoxygenated: yes  Mask difficulty assessment: 0 - not attempted    Final Airway Details  Final airway type: supraglottic airway      Successful airway: LMA and I-gel  Size 4     Number of attempts at approach: 1  Assessment: lips, teeth, and gum same as pre-op and atraumatic intubation

## 2024-04-16 NOTE — DISCHARGE INSTRUCTIONS
DISCHARGE INSTRUCTIONS  SURGICAL / AMBULATORY  PROCEDURES      For your surgery you had:  General anesthesia (you may have a sore throat for the first 24 hours)  IV sedation.  Local anesthesia  Monitored anesthesia Care    You may experience dizziness, drowsiness, or light-headedness for several hours following surgery/procedure.  Do not stay alone today or tonight.  Limit your activity for 24 hours.  Resume your diet slowly.  Follow whatever special dietary instructions you may have been given by your doctor.  You should not drive or operate machinery, drink alcohol, or sign legally binding documents for 24 hours or while you are taking pain medication.  Last dose of pain medication was given at:   Tylenol at 0630 am may start tylenol/pain meds after 1030am today .  NOTIFY YOUR DOCTOR IF YOU EXPERIENCE ANY OF THE FOLLOWING:  Temperature greater than 101 degrees Fahrenheit  Shaking Chills  Redness or excessive drainage from incision  Nausea, vomiting and/or pain that is not controlled by prescribed medications  Increase in bleeding or bleeding that is excessive  Unable to urinate in 6 hours after surgery  If unable to reach your doctor, please go to the closest Emergency Room  You may begin dressing changes:     [] in 24 hours   [x] in 48 hours   [] Other:    .  You may shower 48 hrs   .  Apply an ice pack 24-48 hours.  Medications per physician instructions as indicated on Discharge Medication Information Sheet.  You should see   for follow-up care   on   .  Phone number:       SPECIAL INSTRUCTIONS:

## 2024-04-16 NOTE — ANESTHESIA PREPROCEDURE EVALUATION
Anesthesia Evaluation     Patient summary reviewed and Nursing notes reviewed   no history of anesthetic complications:   NPO Solid Status: > 8 hours  NPO Liquid Status: > 2 hours           Airway   Mallampati: I  TM distance: >3 FB  Neck ROM: full  Dental - normal exam     Pulmonary - negative pulmonary ROS and normal exam   Cardiovascular - negative cardio ROS and normal exam  Exercise tolerance: good (4-7 METS)        Neuro/Psych- negative ROS  GI/Hepatic/Renal/Endo - negative ROS     Musculoskeletal (-) negative ROS    Abdominal  - normal exam   Substance History - negative use     OB/GYN negative ob/gyn ROS         Other - negative ROS       ROS/Med Hx Other: PAT Nursing Notes unavailable.               Anesthesia Plan    ASA 1     general     intravenous induction     Anesthetic plan, risks, benefits, and alternatives have been provided, discussed and informed consent has been obtained with: patient.    Plan discussed with CRNA.    CODE STATUS:

## 2024-04-16 NOTE — ANESTHESIA POSTPROCEDURE EVALUATION
Patient: Charles Hawkins    Procedure Summary       Date: 04/16/24 Room / Location: [facility]  / [facility]    Anesthesia Start: 0806 Anesthesia Stop: 0853    Procedure: MASS SOFT TISSUE EXCISION, LEFT LEG and right chest (Right) Diagnosis:       Localized swelling, mass, or lump of left lower extremity      (Localized swelling, mass, or lump of left lower extremity [R22.42])    Surgeons: Kaye Desai MD Provider: Marjorie Waller MD    Anesthesia Type: general ASA Status: 1            Anesthesia Type: general    Vitals  Vitals Value Taken Time   /82 04/16/24 0925   Temp 36.2 °C (97.2 °F) 04/16/24 0849   Pulse 55 04/16/24 0932   Resp 28 04/16/24 0915   SpO2 87 % 04/16/24 0932   Vitals shown include unfiled device data.        Post Anesthesia Care and Evaluation    Patient location during evaluation: bedside  Patient participation: complete - patient participated  Level of consciousness: awake  Pain management: adequate    Airway patency: patent  PONV Status: none  Cardiovascular status: acceptable and stable  Respiratory status: acceptable  Hydration status: acceptable    Comments: An Anesthesiologist personally participated in the most demanding procedures (including induction and emergence if applicable) in the anesthesia plan, monitored the course of anesthesia administration at frequent intervals and remained physically present and available for immediate diagnosis and treatment of emergencies.

## 2024-04-22 ENCOUNTER — OFFICE VISIT (OUTPATIENT)
Dept: SURGERY | Facility: CLINIC | Age: 44
End: 2024-04-22
Payer: OTHER GOVERNMENT

## 2024-04-22 VITALS — BODY MASS INDEX: 29.12 KG/M2 | WEIGHT: 208 LBS | RESPIRATION RATE: 16 BRPM | HEIGHT: 71 IN

## 2024-04-22 DIAGNOSIS — R22.2 MASS OF CHEST WALL, RIGHT: Primary | ICD-10-CM

## 2024-04-22 LAB
CYTO UR: NORMAL
LAB AP CASE REPORT: NORMAL
LAB AP CLINICAL INFORMATION: NORMAL
LAB AP SPECIAL STAINS: NORMAL
PATH REPORT.FINAL DX SPEC: NORMAL
PATH REPORT.GROSS SPEC: NORMAL

## 2024-04-22 PROCEDURE — 99024 POSTOP FOLLOW-UP VISIT: CPT | Performed by: SURGERY

## 2024-04-22 NOTE — PROGRESS NOTES
"Chief Complaint  Follow-up    Subjective          History of Present Illness  The patient is here to follow up on excision of chest and leg masses.  They are doing well and have no complaints.  Pathology is shown below:  pending    No results found for this or any previous visit.     Objective   Vital Signs:   Resp 16   Ht 180.3 cm (71\")   Wt 94.3 kg (208 lb)   BMI 29.01 kg/m²     Physical Exam  Vitals and nursing note reviewed.   Constitutional:       General: He is not in acute distress.     Appearance: Normal appearance. He is well-developed.   HENT:      Head: Normocephalic and atraumatic.   Eyes:      Extraocular Movements: Extraocular movements intact.      Pupils: Pupils are equal, round, and reactive to light.   Cardiovascular:      Pulses: Normal pulses.   Pulmonary:      Effort: Pulmonary effort is normal. No retractions.      Breath sounds: Normal air entry. No wheezing.   Abdominal:      General: There is no distension.      Palpations: Abdomen is soft.      Tenderness: There is no abdominal tenderness.      Hernia: No hernia is present.   Musculoskeletal:         General: No swelling or deformity.      Cervical back: Neck supple.   Skin:     General: Skin is warm and dry.      Findings: No erythema.      Comments: Surgical Incision Healing Well   Neurological:      General: No focal deficit present.      Mental Status: He is alert and oriented to person, place, and time.      Motor: Motor function is intact.   Psychiatric:         Mood and Affect: Mood normal.         Thought Content: Thought content normal.            Result Review :                 Assessment and Plan    Diagnoses and all orders for this visit:    1. Mass of chest wall, right (Primary)    Followup prn  Will call with pathology    Follow Up   Return if symptoms worsen or fail to improve.  Patient was given instructions and counseling regarding his condition or for health maintenance advice. Please see specific information pulled " into the AVS if appropriate.     Answers submitted by the patient for this visit:  Primary Reason for Visit (Submitted on 4/21/2024)  What is the primary reason for your visit?: Other  Other (Submitted on 4/21/2024)  Please describe your symptoms.: Surgery follow-up  Have you had these symptoms before?: No  How long have you been having these symptoms?: 1-2 weeks  Please list any medications you are currently taking for this condition.: None  Please describe any probable cause for these symptoms. : Surgery…

## 2025-01-10 ENCOUNTER — HOSPITAL ENCOUNTER (OUTPATIENT)
Dept: OTHER | Facility: HOSPITAL | Age: 45
Discharge: HOME | End: 2025-01-10

## 2025-01-11 ENCOUNTER — HOSPITAL ENCOUNTER (OUTPATIENT)
Dept: OTHER | Facility: HOSPITAL | Age: 45
Discharge: HOME | End: 2025-01-11

## 2025-02-26 ENCOUNTER — OFFICE VISIT (OUTPATIENT)
Dept: NEUROSURGERY | Facility: CLINIC | Age: 45
End: 2025-02-26
Payer: OTHER GOVERNMENT

## 2025-02-26 VITALS
WEIGHT: 210.4 LBS | BODY MASS INDEX: 29.46 KG/M2 | SYSTOLIC BLOOD PRESSURE: 123 MMHG | HEIGHT: 71 IN | HEART RATE: 91 BPM | DIASTOLIC BLOOD PRESSURE: 80 MMHG

## 2025-02-26 DIAGNOSIS — M47.817 SPONDYLOSIS OF LUMBOSACRAL REGION WITHOUT MYELOPATHY OR RADICULOPATHY: ICD-10-CM

## 2025-02-26 DIAGNOSIS — M54.12 CERVICAL RADICULOPATHY: ICD-10-CM

## 2025-02-26 DIAGNOSIS — M47.812 CERVICAL SPONDYLOSIS WITHOUT MYELOPATHY: Primary | ICD-10-CM

## 2025-02-26 NOTE — PROGRESS NOTES
Chief Complaint  Establish Care, Back Pain (Pain down into pinkies and tinging ), and Neck Pain    Subjective          Charles Hawkins who is a 44 y.o. year old male who presents to [facility] for evaluation of     History of Present Illness  The patient is a 44-year-old male presenting for neck and low back pain.    He reports persistent dull pain in his neck, which he attributes to a history of cervical spine injuries while serving in the . The first injury occurred in 2017, resulting in a rupture at the C5-C6 level, followed by a subsequent rupture at the C6-C7 level in 2019. He experienced significant weakness on the right side after the first injury and on the left side after the second injury, with incomplete recovery. He suspects permanent nerve damage. Although the condition has improved since the initial herniated disc, he continues to experience frustration due to mobility limitations. He also reports mild symptoms radiating into his arm, particularly affecting the pinky area, which he believes may be due to incorrect sleeping posture. Initially, he experienced intense pain in three fingers, but this has since subsided to mild tingling. He also reports frequent headaches, predominantly on the left side, for which he takes Excedrin Migraine. He has been undergoing regular traction therapy for approximately one year, which provides temporary relief from radiculopathy. He is not currently on any pain medication. He underwent physical therapy following the injuries and received an injection after the first injury, which provided some relief.    He also reports lower back pain, which he believes is a result of his 25-year career in the , involving carrying heavy loads and walking long distances. He does not recall any specific injury to his lower back but reports occasional spasms triggered by certain movements, with the most recent episode occurring 4 to  "5 months ago. These spasms affect both sides and are severe enough to cause him to lie down until they subside. He reports no radiating pain down his legs. He has not received any injections for his lower back pain. 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 He plans to retire at the end of this year. He has undergone physical therapy for his lower back, including traction and stretching exercises.    SOCIAL HISTORY  He has been in the  for 28 years.    MEDICATIONS  Excedrin migraine     History of Previous Spinal Surgery?: No    Nicotine use: non-smoker    BMI: Body mass index is 29.36 kg/m².      Review of Systems   Musculoskeletal:  Positive for arthralgias, back pain, myalgias, neck pain and neck stiffness.   Neurological:  Positive for weakness and numbness.   All other systems reviewed and are negative.       Objective   Vital Signs:   /80   Pulse 91   Ht 180.3 cm (70.98\")   Wt 95.4 kg (210 lb 6.4 oz)   BMI 29.36 kg/m²       Physical Exam  Vitals reviewed.   Constitutional:       Appearance: Normal appearance.   Musculoskeletal:      Right shoulder: No tenderness. Normal range of motion.      Left shoulder: No tenderness. Normal range of motion.      Cervical back: Tenderness present. Pain with movement present. Decreased range of motion.      Lumbar back: Tenderness present. Negative right straight leg raise test and negative left straight leg raise test.      Right hip: No tenderness. Normal range of motion.      Left hip: No tenderness. Normal range of motion.   Neurological:      Mental Status: He is alert.      Motor: Motor strength is normal.     Deep Tendon Reflexes:      Reflex Scores:       Tricep reflexes are 2+ on the right side and 2+ on the left side.       Bicep reflexes are 2+ on the right side and 2+ on the left side.       Brachioradialis reflexes are 2+ on the right side " and 2+ on the left side.       Patellar reflexes are 2+ on the right side and 2+ on the left side.       Achilles reflexes are 2+ on the right side and 2+ on the left side.       Neurological Exam  Mental Status  Alert.    Motor   Strength is 5/5 throughout all four extremities.    Sensory  Sensation is intact to light touch, pinprick, vibration and proprioception in all four extremities.    Reflexes                                            Right                      Left  Brachioradialis                    2+                         2+  Biceps                                 2+                         2+  Triceps                                2+                         2+  Patellar                                2+                         2+  Achilles                                2+                         2+    Right pathological reflexes: Melvin's absent. Ankle clonus absent.  Left pathological reflexes: Melvin's absent. Ankle clonus absent.    Gait  Casual gait is normal including stance, stride, and arm swing.      Physical Exam         Result Review :       Data reviewed : Radiologic studies MRI Lumbar Spine on 1/10/25 at [facility] personally reviewed and interpreted. Multilevel spondylosis, with disc degeneration and facet hypertrophy contributing to multilevel bilateral foraminal narrowing without significant spinal stenosis. No high grade spinal canal or foraminal narrowing.         MRI Cervical Spine on 10/11/25 at [facility] personally reviewed and interpreted. Straightening of the expected cervical lordosis. Degenerative changes at C5/6 and C6/7, resulting in mild canal and bilateral foraminal narrowing. These are the most notable findings.      Assessment and Plan    Diagnoses and all orders for this visit:    1. Cervical spondylosis without myelopathy (Primary)    2. Cervical radiculopathy    3. Spondylosis of lumbosacral region without myelopathy or  radiculopathy        Assessment & Plan  1. Cervicalgia.  The patient's cervicalgia is likely due to the absence of the expected curvature in his neck, which exerts significant strain on the joints and ligaments, potentially leading to neck pain and headaches. The primary issues are observed in the C5-6 and C6-7 regions, with mild degeneration and disc bulges at C3-4 and C4-5. The disc at C5-6 is notably absent, bulging towards the spinal canal, and there are arthritic changes within the vertebrae. There is no significant narrowing of the spinal cord, but a chronic disc osteophyte bulging towards the right side could contribute to radiating pain down the arm, primarily into the first two fingers. Mild narrowing is also present at the C6-7 area, with more pronounced foraminal narrowing on the left side. The current condition does not warrant surgical intervention. He is advised to continue his current management strategies, including physical therapy and traction. If symptoms worsen, he should contact us for potential referral back to pain management for targeted facet joint injections.    2. Low back pain.  The patient's low back pain is likely due to multilevel disc degeneration and facet arthritis, contributing to stiffness. Increased activity can cause the bones to rub against each other, leading to inflammation and back pain. The most significant disc degeneration is observed at L5-S1, with some endplate changes on the vertebrae. There is also foraminal narrowing at L4-5 and mild spinal stenosis and foraminal narrowing at L2-3, slightly worse on the right side. The L1-2 level appears normal. The current condition does not warrant surgical intervention. He is advised to continue his current management strategies, including physical therapy and stretching exercises. He should avoid heavy lifting and prolonged running to prevent further strain on the joints and discs. If muscle spasms become more frequent, a muscle  relaxant may be considered for use as needed. He is encouraged to maintain core strength to support the spine and manage pain.         I spent 45 minutes caring for Charles on this date of service. This time includes time spent by me in the following activities:preparing for the visit, reviewing tests, obtaining and/or reviewing a separately obtained history, performing a medically appropriate examination and/or evaluation , counseling and educating the patient/family/caregiver, documenting information in the medical record, independently interpreting results and communicating that information with the patient/family/caregiver, and care coordination.    Follow Up   Return if symptoms worsen or fail to improve.  Patient was given instructions and counseling regarding his condition or for health maintenance advice.     Patient or patient representative verbalized consent for the use of Ambient Listening during the visit with  SHASHI Dudley for chart documentation. 2/26/2025  16:48 EST

## 2025-02-27 ENCOUNTER — PATIENT ROUNDING (BHMG ONLY) (OUTPATIENT)
Dept: NEUROSURGERY | Facility: CLINIC | Age: 45
End: 2025-02-27
Payer: OTHER GOVERNMENT

## (undated) DEVICE — ELECTRD BLD EDGE COAT 3IN

## (undated) DEVICE — GOWN,REINFRCE,POLY,SIRUS,BREATH SLV,XXLG: Brand: MEDLINE

## (undated) DEVICE — MAJOR-LF: Brand: MEDLINE INDUSTRIES, INC.

## (undated) DEVICE — GOWN,REINFORCE,POLY,SIRUS,BREATH SLV,XLG: Brand: MEDLINE

## (undated) DEVICE — SOL IRRG H2O PL/BG 1000ML STRL

## (undated) DEVICE — TBG PENCL TELESCP MEGADYNE SMOKE EVAC 10FT

## (undated) DEVICE — ADHS LIQ MASTISOL 2/3ML

## (undated) DEVICE — ANTIBACTERIAL VIOLET BRAIDED (POLYGLACTIN 910), SYNTHETIC ABSORBABLE SUTURE: Brand: COATED VICRYL

## (undated) DEVICE — GLV SURG SENSICARE PI ORTHO SZ8 LF STRL

## (undated) DEVICE — STERILE POLYISOPRENE POWDER-FREE SURGICAL GLOVES WITH EMOLLIENT COATING: Brand: PROTEXIS

## (undated) DEVICE — SOLIDIFIER LIQLOC PLS 1500CC BT

## (undated) DEVICE — NON-WOVEN ADHESIVE WOUND DRESSING: Brand: PRIMAPORE ADHESIVE DRESSING 10*8CM

## (undated) DEVICE — Device: Brand: DEFENDO AIR/WATER/SUCTION AND BIOPSY VALVE

## (undated) DEVICE — SOL IRR NACL 0.9PCT 500ML

## (undated) DEVICE — STRIP,CLOSURE,WOUND,MEDI-STRIP,1/2X4: Brand: MEDLINE

## (undated) DEVICE — INTENDED FOR TISSUE SEPARATION, AND OTHER PROCEDURES THAT REQUIRE A SHARP SURGICAL BLADE TO PUNCTURE OR CUT.: Brand: BARD-PARKER ® CARBON RIB-BACK BLADES

## (undated) DEVICE — Device

## (undated) DEVICE — DISPOSABLE DISTAL ATTACHMENT: Brand: DISPOSABLE DISTAL ATTACHMENT

## (undated) DEVICE — ANTIBACTERIAL UNDYED BRAIDED (POLYGLACTIN 910), SYNTHETIC ABSORBABLE SUTURE: Brand: COATED VICRYL